# Patient Record
Sex: MALE | Race: WHITE | Employment: FULL TIME | URBAN - METROPOLITAN AREA
[De-identification: names, ages, dates, MRNs, and addresses within clinical notes are randomized per-mention and may not be internally consistent; named-entity substitution may affect disease eponyms.]

---

## 2020-01-01 ENCOUNTER — HOSPITAL ENCOUNTER (EMERGENCY)
Age: 45
End: 2020-12-24
Attending: EMERGENCY MEDICINE | Admitting: EMERGENCY MEDICINE
Payer: COMMERCIAL

## 2020-01-01 DIAGNOSIS — I46.9 PEA (PULSELESS ELECTRICAL ACTIVITY) (HCC): ICD-10-CM

## 2020-01-01 DIAGNOSIS — I49.01 VENTRICULAR FIBRILLATION (HCC): ICD-10-CM

## 2020-01-01 DIAGNOSIS — I46.9 CARDIAC ARREST (HCC): Primary | ICD-10-CM

## 2020-01-01 LAB
ALBUMIN SERPL-MCNC: 3.3 G/DL (ref 3.5–5)
ALBUMIN/GLOB SERPL: 1.3 {RATIO} (ref 1.1–2.2)
ALP SERPL-CCNC: 51 U/L (ref 45–117)
ALT SERPL-CCNC: 68 U/L (ref 12–78)
ANION GAP BLD CALC-SCNC: 23 MMOL/L (ref 10–20)
ANION GAP SERPL CALC-SCNC: 21 MMOL/L (ref 5–15)
AST SERPL-CCNC: 98 U/L (ref 15–37)
BASOPHILS # BLD: 0 K/UL (ref 0–0.1)
BASOPHILS NFR BLD: 0 % (ref 0–1)
BILIRUB SERPL-MCNC: 0.5 MG/DL (ref 0.2–1)
BUN BLD-MCNC: 12 MG/DL (ref 9–20)
BUN SERPL-MCNC: 12 MG/DL (ref 6–20)
BUN/CREAT SERPL: 8 (ref 12–20)
CA-I BLD-MCNC: 1.47 MMOL/L (ref 1.12–1.32)
CALCIUM SERPL-MCNC: 11.4 MG/DL (ref 8.5–10.1)
CHLORIDE BLD-SCNC: 96 MMOL/L (ref 98–107)
CHLORIDE SERPL-SCNC: 97 MMOL/L (ref 97–108)
CO2 BLD-SCNC: 17 MMOL/L (ref 21–32)
CO2 SERPL-SCNC: 14 MMOL/L (ref 21–32)
COMMENT, HOLDF: NORMAL
CREAT BLD-MCNC: 1.4 MG/DL (ref 0.6–1.3)
CREAT SERPL-MCNC: 1.56 MG/DL (ref 0.7–1.3)
DIFFERENTIAL METHOD BLD: ABNORMAL
EOSINOPHIL # BLD: 0 K/UL (ref 0–0.4)
EOSINOPHIL NFR BLD: 0 % (ref 0–7)
ERYTHROCYTE [DISTWIDTH] IN BLOOD BY AUTOMATED COUNT: 11.8 % (ref 11.5–14.5)
GLOBULIN SER CALC-MCNC: 2.5 G/DL (ref 2–4)
GLUCOSE BLD-MCNC: 222 MG/DL (ref 65–100)
GLUCOSE SERPL-MCNC: 235 MG/DL (ref 65–100)
HCT VFR BLD AUTO: 46.1 % (ref 36.6–50.3)
HCT VFR BLD CALC: 45 % (ref 36.6–50.3)
HGB BLD-MCNC: 14.9 G/DL (ref 12.1–17)
IMM GRANULOCYTES # BLD AUTO: 0 K/UL
IMM GRANULOCYTES NFR BLD AUTO: 0 %
LYMPHOCYTES # BLD: 4.9 K/UL (ref 0.8–3.5)
LYMPHOCYTES NFR BLD: 56 % (ref 12–49)
MCH RBC QN AUTO: 35.7 PG (ref 26–34)
MCHC RBC AUTO-ENTMCNC: 32.3 G/DL (ref 30–36.5)
MCV RBC AUTO: 110.6 FL (ref 80–99)
METAMYELOCYTES NFR BLD MANUAL: 1 %
MONOCYTES # BLD: 0 K/UL (ref 0–1)
MONOCYTES NFR BLD: 0 % (ref 5–13)
MYELOCYTES NFR BLD MANUAL: 2 %
NEUTS BAND NFR BLD MANUAL: 6 % (ref 0–6)
NEUTS SEG # BLD: 3.6 K/UL (ref 1.8–8)
NEUTS SEG NFR BLD: 35 % (ref 32–75)
NRBC # BLD: 0.02 K/UL (ref 0–0.01)
NRBC BLD-RTO: 0.2 PER 100 WBC
PLATELET # BLD AUTO: 121 K/UL (ref 150–400)
PMV BLD AUTO: 9.6 FL (ref 8.9–12.9)
POTASSIUM BLD-SCNC: 2.7 MMOL/L (ref 3.5–5.1)
POTASSIUM SERPL-SCNC: 2.8 MMOL/L (ref 3.5–5.1)
PROT SERPL-MCNC: 5.8 G/DL (ref 6.4–8.2)
RBC # BLD AUTO: 4.17 M/UL (ref 4.1–5.7)
RBC MORPH BLD: ABNORMAL
SAMPLES BEING HELD,HOLD: NORMAL
SERVICE CMNT-IMP: ABNORMAL
SODIUM BLD-SCNC: 133 MMOL/L (ref 136–145)
SODIUM SERPL-SCNC: 132 MMOL/L (ref 136–145)
TROPONIN I BLD-MCNC: <0.04 NG/ML (ref 0–0.08)
TROPONIN I SERPL-MCNC: <0.05 NG/ML
WBC # BLD AUTO: 8.7 K/UL (ref 4.1–11.1)
WBC MORPH BLD: ABNORMAL

## 2020-01-01 PROCEDURE — 74011000250 HC RX REV CODE- 250: Performed by: EMERGENCY MEDICINE

## 2020-01-01 PROCEDURE — 84484 ASSAY OF TROPONIN QUANT: CPT

## 2020-01-01 PROCEDURE — 36415 COLL VENOUS BLD VENIPUNCTURE: CPT

## 2020-01-01 PROCEDURE — 80053 COMPREHEN METABOLIC PANEL: CPT

## 2020-01-01 PROCEDURE — 99284 EMERGENCY DEPT VISIT MOD MDM: CPT

## 2020-01-01 PROCEDURE — 96374 THER/PROPH/DIAG INJ IV PUSH: CPT

## 2020-01-01 PROCEDURE — 92950 HEART/LUNG RESUSCITATION CPR: CPT

## 2020-01-01 PROCEDURE — 80047 BASIC METABLC PNL IONIZED CA: CPT

## 2020-01-01 PROCEDURE — 85025 COMPLETE CBC W/AUTO DIFF WBC: CPT

## 2020-01-01 PROCEDURE — 74011250636 HC RX REV CODE- 250/636: Performed by: EMERGENCY MEDICINE

## 2020-01-01 RX ORDER — CALCIUM GLUCONATE 94 MG/ML
INJECTION, SOLUTION INTRAVENOUS
Status: COMPLETED | OUTPATIENT
Start: 2020-01-01 | End: 2020-01-01

## 2020-01-01 RX ORDER — SODIUM BICARBONATE 1 MEQ/ML
SYRINGE (ML) INTRAVENOUS
Status: COMPLETED | OUTPATIENT
Start: 2020-01-01 | End: 2020-01-01

## 2020-01-01 RX ORDER — AMIODARONE HYDROCHLORIDE 150 MG/3ML
INJECTION, SOLUTION INTRAVENOUS
Status: COMPLETED | OUTPATIENT
Start: 2020-01-01 | End: 2020-01-01

## 2020-01-01 RX ORDER — EPINEPHRINE 0.1 MG/ML
INJECTION INTRACARDIAC; INTRAVENOUS
Status: COMPLETED | OUTPATIENT
Start: 2020-01-01 | End: 2020-01-01

## 2020-01-01 RX ADMIN — AMIODARONE HYDROCHLORIDE 150 MG: 50 INJECTION, SOLUTION INTRAVENOUS at 16:40

## 2020-01-01 RX ADMIN — EPINEPHRINE 1 MG: 0.1 INJECTION, SOLUTION ENDOTRACHEAL; INTRACARDIAC; INTRAVENOUS at 16:38

## 2020-01-01 RX ADMIN — EPINEPHRINE 1 MG: 0.1 INJECTION, SOLUTION ENDOTRACHEAL; INTRACARDIAC; INTRAVENOUS at 16:35

## 2020-01-01 RX ADMIN — CALCIUM GLUCONATE 1 G: 94 INJECTION, SOLUTION INTRAVENOUS at 16:34

## 2020-01-01 RX ADMIN — EPINEPHRINE 1 MG: 0.1 INJECTION, SOLUTION ENDOTRACHEAL; INTRACARDIAC; INTRAVENOUS at 16:32

## 2020-01-01 RX ADMIN — SODIUM BICARBONATE 50 MEQ: 84 INJECTION INTRAVENOUS at 16:33

## 2020-12-24 NOTE — ED TRIAGE NOTES
Patient presents to ED via EMS from home. Per EMS, friends heard pt fall over on toilet and called EMS. Pt unresponsive on EMS arrival. Down less than 5 min. PEA on EMS arrival. Given 3 rounds of epinephrine and 300 of amiodarone.

## 2020-12-24 NOTE — ED PROVIDER NOTES
The history is provided by the EMS personnel. Cardiac arrest  
This is a new problem. The current episode started less than 1 hour ago. The problem has not changed since onset. The problem occurs constantly. Associated with: worked out this morning, found in bathroom by wife's children having difficulty breathing. Treatments tried: epinephrine, intubation. No past medical history on file. No past surgical history on file. No family history on file. Social History Socioeconomic History  Marital status: Not on file Spouse name: Not on file  Number of children: Not on file  Years of education: Not on file  Highest education level: Not on file Occupational History  Not on file Social Needs  Financial resource strain: Not on file  Food insecurity Worry: Not on file Inability: Not on file  Transportation needs Medical: Not on file Non-medical: Not on file Tobacco Use  Smoking status: Not on file Substance and Sexual Activity  Alcohol use: Not on file  Drug use: Not on file  Sexual activity: Not on file Lifestyle  Physical activity Days per week: Not on file Minutes per session: Not on file  Stress: Not on file Relationships  Social connections Talks on phone: Not on file Gets together: Not on file Attends Anabaptist service: Not on file Active member of club or organization: Not on file Attends meetings of clubs or organizations: Not on file Relationship status: Not on file  Intimate partner violence Fear of current or ex partner: Not on file Emotionally abused: Not on file Physically abused: Not on file Forced sexual activity: Not on file Other Topics Concern  Not on file Social History Narrative  Not on file ALLERGIES: Patient has no allergy information on record. Review of Systems Unable to perform ROS: Acuity of condition There were no vitals filed for this visit. Physical Exam 
Constitutional:   
   Appearance: He is well-developed. Interventions: He is intubated. Comments: unresponsive HENT:  
   Head: Normocephalic and atraumatic. Eyes:  
   Comments: No corneal reflexes, non-reactive pupils Neck:  
   Trachea: No tracheal deviation. Cardiovascular:  
   Pulses:     
     Femoral pulses are 0 on the right side and 0 on the left side. Comments: Pulsatile flow with chest compressions Pulmonary:  
   Effort: He is intubated. Abdominal:  
   General: There is no distension. Skin: 
   General: Skin is cool and dry. Coloration: Skin is mottled. Neurological:  
   Mental Status: He is unresponsive. GCS: GCS eye subscore is 1. GCS verbal subscore is 1. GCS motor subscore is 1. MDM 
  
73-year-old male presents with out of hospital cardiac arrest where he was found unconscious and unresponsive with agonal breathing by wife's children. Limited history available for review but reportedly he is on Xarelto with history of atrial fibrillation. He had early bystander CPR and was found by EMS to be in PEA, CPR was continued and he received 3 rounds of epi, was shocked once for ventricular fibrillation which then converted back to PEA, was intubated in the field with end-tidal CO2 readings initially in the 40s but going back into the 20s. On arrival here he has persistent PEA arrest with agonal rhythm. Attempted 1 shock 200 J in case of fine V. fib without any change. Serial cardiac ultrasound was performed and there was no organized cardiac activity throughout the resuscitation. Attempted calcium, bicarb, secondary dose of amiodarone, fluid administration. Overall he received 6 doses of epinephrine and never had meaningful ROSC. There is lung sliding on both sides, doubt tension pneumothorax and no evidence of trauma. Will refer to medical examiner. Patient's wife was updated and condolences offered. He apparently had an electrophysiology check-up yesterday for A-Fib and went to work out this morning. Medical examiner has declined to take the case. Time of death 65 Procedures Critical Care Time: 30 minutes I personally performed critical care time separate of billable procedures which may include ordering and interpretation of testing, ordering of medications, direct patient assessment and reassessment, discussion with consultants or family, and documentation.

## 2020-12-24 NOTE — PROGRESS NOTES
Spiritual Care Assessment/Progress Note Shanice Julio 
 
 
NAME: Chad Solo MRN: [id number] AGE: 39 y.o. SEX: male Synagogue Affiliation:   
Language:   
 
12/24/2020     Total Time (in minutes): 33 Spiritual Assessment begun in [facility] EMERGENCY DEPT through conversation with: 
  
    [x]Patient        [x] Family    [] Friend(s) Reason for Consult: Death, ER Spiritual beliefs: (Please include comment if needed) [x] Identifies with a kiara tradition:  Sun   
   [] Supported by a kiara community:        
   [] Claims no spiritual orientation:       
   [] Seeking spiritual identity:            
   [] Adheres to an individual form of spirituality:       
   [] Not able to assess:                   
 
    
Identified resources for coping:  
   [x] Prayer                           
   [] Music                  [] Guided Imagery [x] Family/friends                 [] Pet visits [] Devotional reading                         [] Unknown 
   [] Other:                                         
 
 
Interventions offered during this visit: (See comments for more details) Family/Friend(s): Iconic (affirming the presence of God/Higher Power), End of life issues discussed, Affirmation of emotions/emotional suffering, Synagogue beliefs/image of God discussed, Affirmation of kiara, Prayer (actual), Prayer (assurance of) Plan of Care: 
 
 [] Support spiritual and/or cultural needs  
 [] Support AMD and/or advance care planning process    
 [] Support grieving process 
 [] Coordinate Rites and/or Rituals  
 [] Coordination with community clergy [] No spiritual needs identified at this time 
 [] Detailed Plan of Care below (See Comments)  [] Make referral to Music Therapy 
[] Make referral to Pet Therapy    
[] Make referral to Addiction services 
[] Make referral to [facility] 
[] Make referral to Spiritual Care Partner 
[x] No future visits requested [] Follow up upon further referrals Comments:  responded to [facility] to the ER. Pt, Mr. Melvi Hansen was dead when  arrived. Supportive presence and emotional support was provided to family. 6 Etienne Murphy, his spouse shared  her feelings of grief and  loss. She also  engaged in brief spiritual reflection, stating that her kiara gives her hope. 6 Etienne Murphy continued to  engage  in life review as she processed her emotions. She shared that they have been  for 22 years, pt has had medical issues since his childhood. His death was still hard to handle at the moment. 6 Etienne Murphy stated that her kiara gives her something to hold on to, knowing that his sufferings are over, and he was in a better place. Pt's two sons also expressed  thoughts about  their grief process.  led family to see pt in the ER. Provided supportive presence and prayer. María pinzon for the support. Visited by: Palak Odom. Adryan pinzon: [phone])

## 2020-12-25 PROCEDURE — 76010000139 HC OR TIME 5.5 TO 6 HR
